# Patient Record
Sex: FEMALE | Race: BLACK OR AFRICAN AMERICAN | NOT HISPANIC OR LATINO | URBAN - METROPOLITAN AREA
[De-identification: names, ages, dates, MRNs, and addresses within clinical notes are randomized per-mention and may not be internally consistent; named-entity substitution may affect disease eponyms.]

---

## 2020-09-22 ENCOUNTER — OUTPATIENT (OUTPATIENT)
Dept: OUTPATIENT SERVICES | Facility: HOSPITAL | Age: 40
LOS: 1 days | End: 2020-09-22

## 2020-09-22 ENCOUNTER — APPOINTMENT (OUTPATIENT)
Dept: SURGERY | Facility: HOSPITAL | Age: 40
End: 2020-09-22

## 2020-09-22 VITALS
WEIGHT: 135 LBS | HEIGHT: 55 IN | HEART RATE: 98 BPM | SYSTOLIC BLOOD PRESSURE: 135 MMHG | TEMPERATURE: 98.7 F | DIASTOLIC BLOOD PRESSURE: 83 MMHG | BODY MASS INDEX: 31.24 KG/M2

## 2020-09-22 DIAGNOSIS — I10 ESSENTIAL (PRIMARY) HYPERTENSION: ICD-10-CM

## 2020-09-22 DIAGNOSIS — Z86.59 PERSONAL HISTORY OF OTHER MENTAL AND BEHAVIORAL DISORDERS: ICD-10-CM

## 2020-09-22 DIAGNOSIS — Z83.3 FAMILY HISTORY OF DIABETES MELLITUS: ICD-10-CM

## 2020-09-22 PROBLEM — Z00.00 ENCOUNTER FOR PREVENTIVE HEALTH EXAMINATION: Status: ACTIVE | Noted: 2020-09-22

## 2020-09-22 RX ORDER — ATORVASTATIN CALCIUM 10 MG/1
10 TABLET, FILM COATED ORAL
Refills: 0 | Status: ACTIVE | COMMUNITY

## 2020-09-22 RX ORDER — AMLODIPINE BESYLATE 10 MG/1
10 TABLET ORAL
Refills: 0 | Status: ACTIVE | COMMUNITY

## 2020-09-25 NOTE — PLAN
[FreeTextEntry1] : Scheduled with Dr. Jose Mcclain for excision of supraclavicular mass on left shoulder for next month. Will go to pre-surgical testing and schedule procedure.

## 2020-09-25 NOTE — PHYSICAL EXAM
[Normal Breath Sounds] : Normal breath sounds [Normal Heart Sounds] : normal heart sounds [No Rash or Lesion] : No rash or lesion [Alert] : alert [de-identified] : NAD, alert [de-identified] : L shoulder supraclavicular mobile, nontender, non erythematous mass palpated, b/l ROM intact and grossly normal extremities

## 2020-09-25 NOTE — HISTORY OF PRESENT ILLNESS
[de-identified] : Patient presenting with L shoulder mass. History was obtained from sister who is primary caregiver, since patient has a history of intellectual disability. The left shoulder mass was noticed 5 years ago that started out "small", but has become larger over the past few years. She was referred by her primary care doctor to surgical clinic for removal of the mass. No other masses noticed on her body. No limitations in range of motion or strength in both arms. The mass is not tender to palpation, and she denies any other swelling or redness in shoulder area. No fever, chills, nausea, vomiting, night sweats, or weight loss.

## 2020-09-25 NOTE — ADDENDUM
[FreeTextEntry1] : Patient with enlarging left supraclavicular mass, 10cm in greatest dimension. Appears soft, and nonfixated.\par plan for u/s and possible excision of lipoma

## 2020-09-25 NOTE — ASSESSMENT
[FreeTextEntry1] : Patient presenting with L supraclavicular mass that is mobile, soft, and nontender. Patient has full ROM and strength in both arms. Denies any other complaints.

## 2020-10-02 DIAGNOSIS — D17.20 BENIGN LIPOMATOUS NEOPLASM OF SKIN AND SUBCUTANEOUS TISSUE OF UNSPECIFIED LIMB: ICD-10-CM

## 2020-10-02 DIAGNOSIS — D17.9 BENIGN LIPOMATOUS NEOPLASM, UNSPECIFIED: ICD-10-CM

## 2020-12-21 ENCOUNTER — OUTPATIENT (OUTPATIENT)
Dept: OUTPATIENT SERVICES | Facility: HOSPITAL | Age: 40
LOS: 1 days | End: 2020-12-21
Payer: MEDICARE

## 2020-12-21 ENCOUNTER — RESULT REVIEW (OUTPATIENT)
Age: 40
End: 2020-12-21

## 2020-12-21 ENCOUNTER — APPOINTMENT (OUTPATIENT)
Dept: ULTRASOUND IMAGING | Facility: CLINIC | Age: 40
End: 2020-12-21
Payer: COMMERCIAL

## 2020-12-21 DIAGNOSIS — Z00.8 ENCOUNTER FOR OTHER GENERAL EXAMINATION: ICD-10-CM

## 2020-12-21 PROCEDURE — 76882 US LMTD JT/FCL EVL NVASC XTR: CPT

## 2020-12-21 PROCEDURE — 76882 US LMTD JT/FCL EVL NVASC XTR: CPT | Mod: 26,LT

## 2021-01-09 DIAGNOSIS — Z01.818 ENCOUNTER FOR OTHER PREPROCEDURAL EXAMINATION: ICD-10-CM

## 2021-01-10 ENCOUNTER — APPOINTMENT (OUTPATIENT)
Dept: DISASTER EMERGENCY | Facility: CLINIC | Age: 41
End: 2021-01-10

## 2021-01-11 ENCOUNTER — OUTPATIENT (OUTPATIENT)
Dept: OUTPATIENT SERVICES | Facility: HOSPITAL | Age: 41
LOS: 1 days | End: 2021-01-11
Payer: COMMERCIAL

## 2021-01-11 VITALS
SYSTOLIC BLOOD PRESSURE: 138 MMHG | WEIGHT: 130.95 LBS | HEART RATE: 96 BPM | OXYGEN SATURATION: 99 % | RESPIRATION RATE: 16 BRPM | HEIGHT: 58 IN | TEMPERATURE: 98 F | DIASTOLIC BLOOD PRESSURE: 90 MMHG

## 2021-01-11 DIAGNOSIS — D17.20 BENIGN LIPOMATOUS NEOPLASM OF SKIN AND SUBCUTANEOUS TISSUE OF UNSPECIFIED LIMB: ICD-10-CM

## 2021-01-11 LAB
ANION GAP SERPL CALC-SCNC: 12 MMOL/L — SIGNIFICANT CHANGE UP (ref 7–14)
BUN SERPL-MCNC: 7 MG/DL — SIGNIFICANT CHANGE UP (ref 7–23)
CALCIUM SERPL-MCNC: 9.2 MG/DL — SIGNIFICANT CHANGE UP (ref 8.4–10.5)
CHLORIDE SERPL-SCNC: 103 MMOL/L — SIGNIFICANT CHANGE UP (ref 98–107)
CO2 SERPL-SCNC: 27 MMOL/L — SIGNIFICANT CHANGE UP (ref 22–31)
CREAT SERPL-MCNC: 0.58 MG/DL — SIGNIFICANT CHANGE UP (ref 0.5–1.3)
GLUCOSE SERPL-MCNC: 96 MG/DL — SIGNIFICANT CHANGE UP (ref 70–99)
HCG UR QL: NEGATIVE — SIGNIFICANT CHANGE UP
HCT VFR BLD CALC: 44.9 % — SIGNIFICANT CHANGE UP (ref 34.5–45)
HGB BLD-MCNC: 14.7 G/DL — SIGNIFICANT CHANGE UP (ref 11.5–15.5)
MCHC RBC-ENTMCNC: 30.4 PG — SIGNIFICANT CHANGE UP (ref 27–34)
MCHC RBC-ENTMCNC: 32.7 GM/DL — SIGNIFICANT CHANGE UP (ref 32–36)
MCV RBC AUTO: 92.8 FL — SIGNIFICANT CHANGE UP (ref 80–100)
NRBC # BLD: 0 /100 WBCS — SIGNIFICANT CHANGE UP
NRBC # FLD: 0 K/UL — SIGNIFICANT CHANGE UP
PLATELET # BLD AUTO: 215 K/UL — SIGNIFICANT CHANGE UP (ref 150–400)
POTASSIUM SERPL-MCNC: 3.5 MMOL/L — SIGNIFICANT CHANGE UP (ref 3.5–5.3)
POTASSIUM SERPL-SCNC: 3.5 MMOL/L — SIGNIFICANT CHANGE UP (ref 3.5–5.3)
RBC # BLD: 4.84 M/UL — SIGNIFICANT CHANGE UP (ref 3.8–5.2)
RBC # FLD: 11.7 % — SIGNIFICANT CHANGE UP (ref 10.3–14.5)
SODIUM SERPL-SCNC: 142 MMOL/L — SIGNIFICANT CHANGE UP (ref 135–145)
WBC # BLD: 6.82 K/UL — SIGNIFICANT CHANGE UP (ref 3.8–10.5)
WBC # FLD AUTO: 6.82 K/UL — SIGNIFICANT CHANGE UP (ref 3.8–10.5)

## 2021-01-11 PROCEDURE — 93010 ELECTROCARDIOGRAM REPORT: CPT

## 2021-01-11 NOTE — H&P PST ADULT - NEGATIVE BREAST SYMPTOMS
no breast tenderness R/no breast lump L/no breast lump R/no nipple discharge L/no nipple discharge R

## 2021-01-11 NOTE — H&P PST ADULT - HISTORY OF PRESENT ILLNESS
41 y/o female long term h/o left shoulder mass, gradually increasing in size. C/O intermittent left shoulder pain with pressure. Pt was referred to surgeon by PCP. 41 y/o female with H/O: HTN, HLD, intellectual disability, Seizure disorder (last episode 2003) presents to PST for pre op evaluation with long term h/o left shoulder mass, gradually increasing in size. C/O intermittent left shoulder pain with pressure. Pt was referred to surgeon by PCP. Now scheduled for left supraclavicular mass excision on 01/13/2021.

## 2021-01-11 NOTE — H&P PST ADULT - NEGATIVE NEUROLOGICAL SYMPTOMS
no tremors/no vertigo/no difficulty walking/no headache no weakness/no paresthesias/no tremors/no vertigo/no loss of sensation/no difficulty walking/no headache

## 2021-01-11 NOTE — H&P PST ADULT - ASSESSMENT
41 y/o female presents with pre op diagnosis: benign lipomatous neoplasm of skin and subcutaneous tissue of unspecified limb

## 2021-01-11 NOTE — H&P PST ADULT - NSICDXPASTMEDICALHX_GEN_ALL_CORE_FT
PAST MEDICAL HISTORY:  Dyslipidemia     Grand Mal Seizure Disorder last episode 2003    HTN (hypertension)     Mental Retardation

## 2021-01-12 ENCOUNTER — TRANSCRIPTION ENCOUNTER (OUTPATIENT)
Age: 41
End: 2021-01-12

## 2021-01-12 LAB — SARS-COV-2 N GENE NPH QL NAA+PROBE: NOT DETECTED

## 2021-01-12 NOTE — ASU PATIENT PROFILE, ADULT - NS PRO TALK SOMEONE YN
Due to patient being non-English speaking/uses sign language, an  was used for this visit. Only for face-to-face interpretation by an external agency, date and length of interpretation can be found on the scanned worksheet.     name: Jose Rivers   Agency: [employer]  Language: George  Telephone number: [phone]  Type of interpretation: Face-to-face, spoken     no

## 2021-01-12 NOTE — ASU PATIENT PROFILE, ADULT - PMH
Dyslipidemia    Grand Mal Seizure Disorder  last episode 2003  HTN (hypertension)    Mental Retardation

## 2021-01-13 ENCOUNTER — RESULT REVIEW (OUTPATIENT)
Age: 41
End: 2021-01-13

## 2021-01-13 ENCOUNTER — APPOINTMENT (OUTPATIENT)
Dept: SURGERY | Facility: HOSPITAL | Age: 41
End: 2021-01-13

## 2021-01-13 ENCOUNTER — OUTPATIENT (OUTPATIENT)
Dept: OUTPATIENT SERVICES | Facility: HOSPITAL | Age: 41
LOS: 1 days | Discharge: HOME | End: 2021-01-13
Payer: MEDICARE

## 2021-01-13 VITALS
HEART RATE: 98 BPM | DIASTOLIC BLOOD PRESSURE: 79 MMHG | RESPIRATION RATE: 14 BRPM | OXYGEN SATURATION: 100 % | TEMPERATURE: 98 F | HEIGHT: 58 IN | SYSTOLIC BLOOD PRESSURE: 132 MMHG | WEIGHT: 130.07 LBS

## 2021-01-13 VITALS
DIASTOLIC BLOOD PRESSURE: 72 MMHG | HEART RATE: 93 BPM | RESPIRATION RATE: 16 BRPM | OXYGEN SATURATION: 99 % | SYSTOLIC BLOOD PRESSURE: 129 MMHG | TEMPERATURE: 98 F

## 2021-01-13 DIAGNOSIS — D17.20 BENIGN LIPOMATOUS NEOPLASM OF SKIN AND SUBCUTANEOUS TISSUE OF UNSPECIFIED LIMB: ICD-10-CM

## 2021-01-13 LAB — HCG UR QL: NEGATIVE — SIGNIFICANT CHANGE UP

## 2021-01-13 PROCEDURE — 11406 EXC TR-EXT B9+MARG >4.0 CM: CPT | Mod: GC

## 2021-01-13 PROCEDURE — 88304 TISSUE EXAM BY PATHOLOGIST: CPT | Mod: 26

## 2021-01-13 RX ORDER — OXYCODONE HYDROCHLORIDE 5 MG/1
1 TABLET ORAL
Qty: 5 | Refills: 0
Start: 2021-01-13

## 2021-01-13 RX ORDER — ATORVASTATIN CALCIUM 80 MG/1
1 TABLET, FILM COATED ORAL
Qty: 0 | Refills: 0 | DISCHARGE

## 2021-01-13 RX ORDER — ASCORBIC ACID 60 MG
1 TABLET,CHEWABLE ORAL
Qty: 0 | Refills: 0 | DISCHARGE

## 2021-01-13 RX ORDER — AMLODIPINE BESYLATE 2.5 MG/1
1 TABLET ORAL
Qty: 0 | Refills: 0 | DISCHARGE

## 2021-01-13 NOTE — ASU DISCHARGE PLAN (ADULT/PEDIATRIC) - ASU DC SPECIAL INSTRUCTIONSFT
Breast Biopsy/Lumpectomy Discharge Instructions    1.	Follow-up Appointment- Please call the office to schedule your post-operative appointment which should be approximately 7-10 days after your surgery. [phone]    2.	Bruising/Bleeding/ Swelling – It is normal for there to be some bruising and swelling at the incision site. Some discomfort at the surgical site is also normal. If your symptoms seem excessive, or if you have any questions or concerns, please call the office.    4.	Wound Dressing – The incision(s) will be covered with a special type of surgery glue called Dermabond. It has a purplish hue and looks like plastic. It should stay on the skin until it flakes off naturally over the following 1-2 weeks. Please DO NOT peel off the Dermabond. All of the stitches are “internal” and will dissolve naturally.     6.	Showering/Bathing- You may shower over the incision 48 hours after your surgery. Allow water to run over the incision, but do NOT scrub the area. It is best not to sit in a bathtub or swimming pool for at least 1 week after surgery.     7.	Activity Level- You may resume most normal daily activity as tolerated, but avoid strenuous activities such as aerobics, jogging, exercising, or heavy lifting for at least 1 week after surgery. You may return to work in 1-2 days after surgery.     8.	Pain Medication- We will send a pain medication prescription to your pharmacy. You may take the prescribed medication for severe pain as directed. For mild to moderate pain please take extra strength Tylenol, alternating every 4 hours with motrin/ibuprofen as needed.         Anesthesia Precautions:  For the next 12 hours do not:   •	drive a car,  •	 drink alcohol, beer, or wine,   •	make important personal or business decisions    Physician Notification  •	Any pharmacy prescription issue  •	Bleeding that does not stop  •	Persistent nausea and vomiting  •	Pain not relieved by medications  •	Fever greater than 101®F  •	Inability to tolerate liquids or foods  •	Unable to urinate after 8 hours  Discharge and Disposition  •	Discharge to home/ group home/assisted living  •	Accompanied by Family/Spouse/ Parents/ Significant Other/ Friend/ and or Caregiver  Follow Up Care:  •	In the event that you develop a complication and you are unable to reach your own physician, you may contact:  [phone] or go to the nearest Emergency Room.   •	Please call your surgeon to schedule your follow up appointment [phone]

## 2021-01-13 NOTE — ASU DISCHARGE PLAN (ADULT/PEDIATRIC) - NURSING INSTRUCTIONS
DO NOT take any Tylenol (Acetaminophen) or narcotics containing Tylenol until after  7:15pm . You received Tylenol during your operation and it can cause damage to your liver if too much is taken within a 24 hour time period.

## 2021-01-13 NOTE — ASU DISCHARGE PLAN (ADULT/PEDIATRIC) - CARE PROVIDER_API CALL
Jose Mcclain (MD)  Surgery  Critical Care  [address] [place] [address]  Phone: [phone]  Fax: [phone]  Follow Up Time: 1 week

## 2021-01-19 PROBLEM — I10 ESSENTIAL (PRIMARY) HYPERTENSION: Chronic | Status: ACTIVE | Noted: 2021-01-11

## 2021-01-19 PROBLEM — E78.5 HYPERLIPIDEMIA, UNSPECIFIED: Chronic | Status: ACTIVE | Noted: 2021-01-11

## 2021-01-20 ENCOUNTER — APPOINTMENT (OUTPATIENT)
Dept: SURGERY | Facility: CLINIC | Age: 41
End: 2021-01-20
Payer: COMMERCIAL

## 2021-01-20 VITALS — HEART RATE: 79 BPM | SYSTOLIC BLOOD PRESSURE: 132 MMHG | DIASTOLIC BLOOD PRESSURE: 84 MMHG

## 2021-01-20 VITALS — WEIGHT: 135 LBS | TEMPERATURE: 98 F | BODY MASS INDEX: 31.24 KG/M2 | HEIGHT: 55 IN

## 2021-01-20 LAB — SURGICAL PATHOLOGY STUDY: SIGNIFICANT CHANGE UP

## 2021-01-20 PROCEDURE — 99024 POSTOP FOLLOW-UP VISIT: CPT

## 2021-01-20 NOTE — HISTORY OF PRESENT ILLNESS
[de-identified] : 40yf s/p supraclavicular mass excision left side. No complaints. On exam incision clean dry and intact.

## 2021-06-14 NOTE — H&P PST ADULT - NEGATIVE IMMUNOLOGICAL SYMPTOMS
Please excuse patient and her partner who were in the emergency department
no recurring infections/no persistent infections/no recurring pneumonia
